# Patient Record
Sex: MALE | Race: WHITE | NOT HISPANIC OR LATINO | URBAN - METROPOLITAN AREA
[De-identification: names, ages, dates, MRNs, and addresses within clinical notes are randomized per-mention and may not be internally consistent; named-entity substitution may affect disease eponyms.]

---

## 2019-03-13 ENCOUNTER — OFFICE VISIT (OUTPATIENT)
Dept: URBAN - METROPOLITAN AREA CLINIC 23 | Facility: CLINIC | Age: 79
End: 2019-03-13
Payer: MEDICARE

## 2019-03-13 DIAGNOSIS — E11.9 TYPE 2 DIABETES MELLITUS WITHOUT COMPLICATIONS: Primary | ICD-10-CM

## 2019-03-13 DIAGNOSIS — H52.13 MYOPIA, BILATERAL: ICD-10-CM

## 2019-03-13 DIAGNOSIS — H25.813 COMBINED FORMS OF AGE-RELATED CATARACT, BILATERAL: ICD-10-CM

## 2019-03-13 PROCEDURE — 92014 COMPRE OPH EXAM EST PT 1/>: CPT | Performed by: OPTOMETRIST

## 2019-03-13 ASSESSMENT — INTRAOCULAR PRESSURE
OD: 22
OS: 22

## 2019-03-13 ASSESSMENT — VISUAL ACUITY
OS: 20/40
OD: 20/40

## 2019-03-13 NOTE — IMPRESSION/PLAN
Impression: Combined forms of age-related cataract, bilateral: H25.813. Plan: Cataracts account for the patient's complaints. Pt prefers to wait for surgery. The patient will monitor vision changes and contact us with any decrease in vision.

## 2019-03-13 NOTE — IMPRESSION/PLAN
Impression: Myopia, bilateral: H52.13. Plan: Released new SRx to pt. Advised pt that cataracts are the cause for changes in vision. Pt wanted new SRx d/t coating wearing off on lenses.

## 2019-03-13 NOTE — IMPRESSION/PLAN
Impression: Type 2 diabetes mellitus without complications: R45.0. Plan: Diabetes type II: no background retinopathy, no signs of neovascularization noted. Discussed ocular and systemic benefits of blood sugar control.